# Patient Record
Sex: FEMALE | Race: WHITE | NOT HISPANIC OR LATINO | URBAN - METROPOLITAN AREA
[De-identification: names, ages, dates, MRNs, and addresses within clinical notes are randomized per-mention and may not be internally consistent; named-entity substitution may affect disease eponyms.]

---

## 2017-04-30 ENCOUNTER — EMERGENCY (EMERGENCY)
Facility: HOSPITAL | Age: 37
LOS: 1 days | Discharge: HOME | End: 2017-04-30
Attending: EMERGENCY MEDICINE | Admitting: EMERGENCY MEDICINE
Payer: COMMERCIAL

## 2017-04-30 VITALS
WEIGHT: 160.06 LBS | OXYGEN SATURATION: 98 % | TEMPERATURE: 98 F | SYSTOLIC BLOOD PRESSURE: 131 MMHG | HEIGHT: 66 IN | DIASTOLIC BLOOD PRESSURE: 71 MMHG | HEART RATE: 77 BPM | RESPIRATION RATE: 18 BRPM

## 2017-04-30 DIAGNOSIS — S99.911A UNSPECIFIED INJURY OF RIGHT ANKLE, INITIAL ENCOUNTER: ICD-10-CM

## 2017-04-30 DIAGNOSIS — M25.571 PAIN IN RIGHT ANKLE AND JOINTS OF RIGHT FOOT: ICD-10-CM

## 2017-04-30 DIAGNOSIS — Z88.0 ALLERGY STATUS TO PENICILLIN: ICD-10-CM

## 2017-04-30 PROCEDURE — 73620 X-RAY EXAM OF FOOT: CPT | Mod: 26,RT

## 2017-04-30 PROCEDURE — 73610 X-RAY EXAM OF ANKLE: CPT | Mod: 26,RT

## 2017-04-30 PROCEDURE — 99283 EMERGENCY DEPT VISIT LOW MDM: CPT | Mod: 25

## 2017-04-30 RX ORDER — TRAMADOL HYDROCHLORIDE 50 MG/1
1 TABLET ORAL
Qty: 12 | Refills: 0 | OUTPATIENT
Start: 2017-04-30 | End: 2017-05-03

## 2017-04-30 RX ORDER — ACETAMINOPHEN 500 MG
1 TABLET ORAL
Qty: 28 | Refills: 0 | OUTPATIENT
Start: 2017-04-30 | End: 2017-05-07

## 2017-04-30 NOTE — ED PROVIDER NOTE - MEDICAL DECISION MAKING DETAILS
patient rolled ankle while walking yesterday, difficulty bearing weight due to pain, nvi, prelim xrays no fx, probable sprain, zamora dressing, crutches, weight bearing as tolerated, rx pain meds - will avoid nsaids as patient has hx PUD, close follow up with ortho

## 2017-04-30 NOTE — ED PROVIDER NOTE - DIAGNOSTIC INTERPRETATION
Interpreted by PA  R ankle   No fracture, no dislocation (joint spaces grossly normal), no Foreign Body noted, soft tissue swelling    R foot   Interpreted by PA  No fracture, no dislocation (joint spaces grossly normal), no Foreign Body noted, soft tissue normal

## 2017-04-30 NOTE — ED ADULT NURSE NOTE - OBJECTIVE STATEMENT
Patient reports "Twisted" ankle while walking x1 day ago. Did not fall. c/o pain to R ankle, worse w/weight bearing. Took Tylenol last night w/mild relief. Ibuprofen offered--unable to take due to ulcer. Swelling to R ankle noted. Ice pack given. Instructed on use. Patient verbalized understanding. Xray done--awaiting results.

## 2017-04-30 NOTE — ED PROVIDER NOTE - OBJECTIVE STATEMENT
37 yo female otherwise healthy here c/o right ankle pain sustained while she was walking in wedge heels last night, rolled ankle while walking. No numbness, no weakness. States she has been hopping when walking, unable to bear significant weight onto right foot. No head trauma, no LOC. No other injuries. 37 yo female otherwise healthy here c/o right ankle pain sustained while she was walking in wedge heels last night, rolled ankle while walking. No numbness, no weakness. States she has been hopping when walking, unable to bear significant weight onto right foot. No head trauma, no LOC. No other injuries. She is declining pain medications in the ED.

## 2017-04-30 NOTE — ED PROVIDER NOTE - MUSCULOSKELETAL, MLM
RLE: +mild-moderate swelling to lateral and medial malleolus swelling, +ttp to med and lateral mal, good ROM ankle/foot. no foot tenderness. dp/pt pulses 2+ intact, able to wiggle toes, good cap refill, no proximal tib/fib tenderness, good ROM knee. unable to bear weight due to pain.

## 2018-02-25 VITALS
DIASTOLIC BLOOD PRESSURE: 76 MMHG | OXYGEN SATURATION: 100 % | RESPIRATION RATE: 22 BRPM | SYSTOLIC BLOOD PRESSURE: 118 MMHG | TEMPERATURE: 98 F | HEART RATE: 81 BPM | WEIGHT: 175.05 LBS

## 2018-02-25 LAB
ALBUMIN SERPL ELPH-MCNC: 3.4 G/DL — SIGNIFICANT CHANGE UP (ref 3.4–5)
ALP SERPL-CCNC: 85 U/L — SIGNIFICANT CHANGE UP (ref 40–120)
ALT FLD-CCNC: 21 U/L — SIGNIFICANT CHANGE UP (ref 12–42)
ANION GAP SERPL CALC-SCNC: 13 MMOL/L — SIGNIFICANT CHANGE UP (ref 9–16)
AST SERPL-CCNC: 19 U/L — SIGNIFICANT CHANGE UP (ref 15–37)
BILIRUB SERPL-MCNC: 0.4 MG/DL — SIGNIFICANT CHANGE UP (ref 0.2–1.2)
BUN SERPL-MCNC: 12 MG/DL — SIGNIFICANT CHANGE UP (ref 7–23)
CALCIUM SERPL-MCNC: 9.7 MG/DL — SIGNIFICANT CHANGE UP (ref 8.5–10.5)
CHLORIDE SERPL-SCNC: 103 MMOL/L — SIGNIFICANT CHANGE UP (ref 96–108)
CO2 SERPL-SCNC: 23 MMOL/L — SIGNIFICANT CHANGE UP (ref 22–31)
CREAT SERPL-MCNC: 0.8 MG/DL — SIGNIFICANT CHANGE UP (ref 0.5–1.3)
GLUCOSE SERPL-MCNC: 130 MG/DL — HIGH (ref 70–99)
HCT VFR BLD CALC: 43.1 % — SIGNIFICANT CHANGE UP (ref 34.5–45)
HGB BLD-MCNC: 14.1 G/DL — SIGNIFICANT CHANGE UP (ref 11.5–15.5)
LIDOCAIN IGE QN: 149 U/L — SIGNIFICANT CHANGE UP (ref 73–393)
MCHC RBC-ENTMCNC: 29.2 PG — SIGNIFICANT CHANGE UP (ref 27–34)
MCHC RBC-ENTMCNC: 32.7 G/DL — SIGNIFICANT CHANGE UP (ref 32–36)
MCV RBC AUTO: 89.2 FL — SIGNIFICANT CHANGE UP (ref 80–100)
PLATELET # BLD AUTO: 348 K/UL — SIGNIFICANT CHANGE UP (ref 150–400)
POTASSIUM SERPL-MCNC: 3.7 MMOL/L — SIGNIFICANT CHANGE UP (ref 3.5–5.3)
POTASSIUM SERPL-SCNC: 3.7 MMOL/L — SIGNIFICANT CHANGE UP (ref 3.5–5.3)
PROT SERPL-MCNC: 7.7 G/DL — SIGNIFICANT CHANGE UP (ref 6.4–8.2)
RBC # BLD: 4.83 M/UL — SIGNIFICANT CHANGE UP (ref 3.8–5.2)
RBC # FLD: 12.1 % — SIGNIFICANT CHANGE UP (ref 10.3–16.9)
SODIUM SERPL-SCNC: 139 MMOL/L — SIGNIFICANT CHANGE UP (ref 132–145)
WBC # BLD: 17.7 K/UL — HIGH (ref 3.8–10.5)
WBC # FLD AUTO: 17.7 K/UL — HIGH (ref 3.8–10.5)

## 2018-02-25 PROCEDURE — 99285 EMERGENCY DEPT VISIT HI MDM: CPT

## 2018-02-25 RX ORDER — IOHEXOL 300 MG/ML
50 INJECTION, SOLUTION INTRAVENOUS ONCE
Qty: 0 | Refills: 0 | Status: COMPLETED | OUTPATIENT
Start: 2018-02-25 | End: 2018-02-25

## 2018-02-25 RX ORDER — SODIUM CHLORIDE 9 MG/ML
3 INJECTION INTRAMUSCULAR; INTRAVENOUS; SUBCUTANEOUS ONCE
Qty: 0 | Refills: 0 | Status: COMPLETED | OUTPATIENT
Start: 2018-02-25 | End: 2018-02-25

## 2018-02-25 RX ORDER — MORPHINE SULFATE 50 MG/1
4 CAPSULE, EXTENDED RELEASE ORAL ONCE
Qty: 0 | Refills: 0 | Status: DISCONTINUED | OUTPATIENT
Start: 2018-02-25 | End: 2018-02-25

## 2018-02-25 RX ORDER — ONDANSETRON 8 MG/1
4 TABLET, FILM COATED ORAL ONCE
Qty: 0 | Refills: 0 | Status: COMPLETED | OUTPATIENT
Start: 2018-02-25 | End: 2018-02-25

## 2018-02-25 RX ADMIN — IOHEXOL 50 MILLILITER(S): 300 INJECTION, SOLUTION INTRAVENOUS at 23:14

## 2018-02-25 RX ADMIN — SODIUM CHLORIDE 3 MILLILITER(S): 9 INJECTION INTRAMUSCULAR; INTRAVENOUS; SUBCUTANEOUS at 23:15

## 2018-02-25 RX ADMIN — Medication 30 MILLIGRAM(S): at 22:30

## 2018-02-25 RX ADMIN — ONDANSETRON 4 MILLIGRAM(S): 8 TABLET, FILM COATED ORAL at 23:15

## 2018-02-25 NOTE — ED PROVIDER NOTE - MEDICAL DECISION MAKING DETAILS
37 F presents to ED c/o intermittent diffuse abdominal pain.  Pt arrived well appearing. vSS.  NAD.  Abd soft, diffusely tender.  WBc = 17.  CT abd/pelvis ordered.

## 2018-02-25 NOTE — ED ADULT TRIAGE NOTE - CHIEF COMPLAINT QUOTE
pt came in ambulatory, complaining of abdominal pain x 7 days, worsening today. Denies diarrhea, constipation, fevers, urinary sx. pt came in ambulatory, complaining of abdominal pain x 7 days, worsening today associated with nausea/vomiting. Denies diarrhea, constipation, fevers, urinary sx.

## 2018-02-25 NOTE — ED PROVIDER NOTE - PROGRESS NOTE DETAILS
CT shows partially contracted gallbladder without radiopaque stones.   Low-attenuation surrounding the gallbladder wall is suggestive for a   small amount of pericholecystic cystic fluid. No significant infiltration   of the surrounding fat is appreciated. Findings may represent an early   cholecystitis. WBC 17, normal LFTs, nml lipase. Repeat abdominal exam +RUQ tenderness, spoke with Dr. Estes covering ACS, will admit for acute cholecystitis, pen allergic, IV flagyl/levaquin, ordered, patient agrees with plan

## 2018-02-26 ENCOUNTER — INPATIENT (INPATIENT)
Facility: HOSPITAL | Age: 38
LOS: 1 days | Discharge: HOME | DRG: 419 | End: 2018-02-28
Attending: SURGERY | Admitting: SURGERY
Payer: COMMERCIAL

## 2018-02-26 LAB
APPEARANCE UR: CLEAR — SIGNIFICANT CHANGE UP
BILIRUB UR-MCNC: (no result)
COLOR SPEC: YELLOW — SIGNIFICANT CHANGE UP
DIFF PNL FLD: NEGATIVE — SIGNIFICANT CHANGE UP
GLUCOSE UR QL: NEGATIVE — SIGNIFICANT CHANGE UP
HCG UR QL: NEGATIVE — SIGNIFICANT CHANGE UP
KETONES UR-MCNC: NEGATIVE — SIGNIFICANT CHANGE UP
LEUKOCYTE ESTERASE UR-ACNC: NEGATIVE — SIGNIFICANT CHANGE UP
NITRITE UR-MCNC: NEGATIVE — SIGNIFICANT CHANGE UP
PH UR: 6.5 — SIGNIFICANT CHANGE UP (ref 5–8)
PROT UR-MCNC: (no result) MG/DL
SP GR SPEC: 1.02 — SIGNIFICANT CHANGE UP (ref 1–1.03)
UROBILINOGEN FLD QL: 1 E.U./DL — SIGNIFICANT CHANGE UP

## 2018-02-26 PROCEDURE — 76700 US EXAM ABDOM COMPLETE: CPT | Mod: 26

## 2018-02-26 PROCEDURE — 74177 CT ABD & PELVIS W/CONTRAST: CPT | Mod: 26

## 2018-02-26 RX ORDER — DOCUSATE SODIUM 100 MG
100 CAPSULE ORAL THREE TIMES A DAY
Qty: 0 | Refills: 0 | Status: DISCONTINUED | OUTPATIENT
Start: 2018-02-26 | End: 2018-02-26

## 2018-02-26 RX ORDER — ACETAMINOPHEN 500 MG
1000 TABLET ORAL ONCE
Qty: 0 | Refills: 0 | Status: COMPLETED | OUTPATIENT
Start: 2018-02-27 | End: 2018-02-27

## 2018-02-26 RX ORDER — HYDROMORPHONE HYDROCHLORIDE 2 MG/ML
0.3 INJECTION INTRAMUSCULAR; INTRAVENOUS; SUBCUTANEOUS EVERY 4 HOURS
Qty: 0 | Refills: 0 | Status: DISCONTINUED | OUTPATIENT
Start: 2018-02-26 | End: 2018-02-28

## 2018-02-26 RX ORDER — PANTOPRAZOLE SODIUM 20 MG/1
40 TABLET, DELAYED RELEASE ORAL EVERY 24 HOURS
Qty: 0 | Refills: 0 | Status: DISCONTINUED | OUTPATIENT
Start: 2018-02-26 | End: 2018-02-26

## 2018-02-26 RX ORDER — METRONIDAZOLE 500 MG
500 TABLET ORAL
Qty: 0 | Refills: 0 | Status: COMPLETED | OUTPATIENT
Start: 2018-02-26 | End: 2018-02-26

## 2018-02-26 RX ORDER — HEPARIN SODIUM 5000 [USP'U]/ML
5000 INJECTION INTRAVENOUS; SUBCUTANEOUS EVERY 8 HOURS
Qty: 0 | Refills: 0 | Status: DISCONTINUED | OUTPATIENT
Start: 2018-02-26 | End: 2018-02-28

## 2018-02-26 RX ORDER — METRONIDAZOLE 500 MG
500 TABLET ORAL EVERY 8 HOURS
Qty: 0 | Refills: 0 | Status: DISCONTINUED | OUTPATIENT
Start: 2018-02-26 | End: 2018-02-26

## 2018-02-26 RX ORDER — HEPARIN SODIUM 5000 [USP'U]/ML
5000 INJECTION INTRAVENOUS; SUBCUTANEOUS EVERY 8 HOURS
Qty: 0 | Refills: 0 | Status: DISCONTINUED | OUTPATIENT
Start: 2018-02-26 | End: 2018-02-26

## 2018-02-26 RX ORDER — ONDANSETRON 8 MG/1
4 TABLET, FILM COATED ORAL EVERY 6 HOURS
Qty: 0 | Refills: 0 | Status: DISCONTINUED | OUTPATIENT
Start: 2018-02-26 | End: 2018-02-26

## 2018-02-26 RX ORDER — HYDROMORPHONE HYDROCHLORIDE 2 MG/ML
1 INJECTION INTRAMUSCULAR; INTRAVENOUS; SUBCUTANEOUS EVERY 4 HOURS
Qty: 0 | Refills: 0 | Status: DISCONTINUED | OUTPATIENT
Start: 2018-02-26 | End: 2018-02-26

## 2018-02-26 RX ORDER — PANTOPRAZOLE SODIUM 20 MG/1
40 TABLET, DELAYED RELEASE ORAL DAILY
Qty: 0 | Refills: 0 | Status: DISCONTINUED | OUTPATIENT
Start: 2018-02-26 | End: 2018-02-28

## 2018-02-26 RX ORDER — CEFTRIAXONE 500 MG/1
1 INJECTION, POWDER, FOR SOLUTION INTRAMUSCULAR; INTRAVENOUS EVERY 12 HOURS
Qty: 0 | Refills: 0 | Status: DISCONTINUED | OUTPATIENT
Start: 2018-02-26 | End: 2018-02-28

## 2018-02-26 RX ORDER — HYDROMORPHONE HYDROCHLORIDE 2 MG/ML
0.5 INJECTION INTRAMUSCULAR; INTRAVENOUS; SUBCUTANEOUS EVERY 4 HOURS
Qty: 0 | Refills: 0 | Status: DISCONTINUED | OUTPATIENT
Start: 2018-02-26 | End: 2018-02-26

## 2018-02-26 RX ORDER — ONDANSETRON 8 MG/1
4 TABLET, FILM COATED ORAL EVERY 4 HOURS
Qty: 0 | Refills: 0 | Status: DISCONTINUED | OUTPATIENT
Start: 2018-02-26 | End: 2018-02-28

## 2018-02-26 RX ORDER — ACETAMINOPHEN 500 MG
1000 TABLET ORAL ONCE
Qty: 0 | Refills: 0 | Status: COMPLETED | OUTPATIENT
Start: 2018-02-26 | End: 2018-02-26

## 2018-02-26 RX ORDER — HYDROMORPHONE HYDROCHLORIDE 2 MG/ML
0.5 INJECTION INTRAMUSCULAR; INTRAVENOUS; SUBCUTANEOUS
Qty: 0 | Refills: 0 | Status: DISCONTINUED | OUTPATIENT
Start: 2018-02-26 | End: 2018-02-28

## 2018-02-26 RX ORDER — SODIUM CHLORIDE 9 MG/ML
1000 INJECTION, SOLUTION INTRAVENOUS
Qty: 0 | Refills: 0 | Status: DISCONTINUED | OUTPATIENT
Start: 2018-02-26 | End: 2018-02-28

## 2018-02-26 RX ORDER — CEFOXITIN 1 G/1
2 INJECTION, POWDER, FOR SOLUTION INTRAVENOUS EVERY 6 HOURS
Qty: 0 | Refills: 0 | Status: DISCONTINUED | OUTPATIENT
Start: 2018-02-26 | End: 2018-02-26

## 2018-02-26 RX ORDER — HYDROMORPHONE HYDROCHLORIDE 2 MG/ML
0.5 INJECTION INTRAMUSCULAR; INTRAVENOUS; SUBCUTANEOUS EVERY 4 HOURS
Qty: 0 | Refills: 0 | Status: DISCONTINUED | OUTPATIENT
Start: 2018-02-26 | End: 2018-02-28

## 2018-02-26 RX ORDER — SODIUM CHLORIDE 9 MG/ML
1000 INJECTION, SOLUTION INTRAVENOUS
Qty: 0 | Refills: 0 | Status: DISCONTINUED | OUTPATIENT
Start: 2018-02-26 | End: 2018-02-26

## 2018-02-26 RX ORDER — KETOROLAC TROMETHAMINE 30 MG/ML
30 SYRINGE (ML) INJECTION ONCE
Qty: 0 | Refills: 0 | Status: DISCONTINUED | OUTPATIENT
Start: 2018-02-26 | End: 2018-02-25

## 2018-02-26 RX ORDER — METRONIDAZOLE 500 MG
500 TABLET ORAL EVERY 8 HOURS
Qty: 0 | Refills: 0 | Status: DISCONTINUED | OUTPATIENT
Start: 2018-02-26 | End: 2018-02-28

## 2018-02-26 RX ORDER — KETOROLAC TROMETHAMINE 30 MG/ML
15 SYRINGE (ML) INJECTION EVERY 6 HOURS
Qty: 0 | Refills: 0 | Status: COMPLETED | OUTPATIENT
Start: 2018-02-26 | End: 2018-02-28

## 2018-02-26 RX ORDER — SENNA PLUS 8.6 MG/1
2 TABLET ORAL AT BEDTIME
Qty: 0 | Refills: 0 | Status: DISCONTINUED | OUTPATIENT
Start: 2018-02-26 | End: 2018-02-26

## 2018-02-26 RX ADMIN — HEPARIN SODIUM 5000 UNIT(S): 5000 INJECTION INTRAVENOUS; SUBCUTANEOUS at 22:12

## 2018-02-26 RX ADMIN — Medication 100 MILLIGRAM(S): at 03:30

## 2018-02-26 RX ADMIN — Medication 400 MILLIGRAM(S): at 22:12

## 2018-02-26 RX ADMIN — Medication 1000 MILLIGRAM(S): at 22:49

## 2018-02-26 RX ADMIN — HYDROMORPHONE HYDROCHLORIDE 0.5 MILLIGRAM(S): 2 INJECTION INTRAMUSCULAR; INTRAVENOUS; SUBCUTANEOUS at 16:45

## 2018-02-26 RX ADMIN — Medication 100 MILLIGRAM(S): at 07:51

## 2018-02-26 RX ADMIN — CEFOXITIN 100 GRAM(S): 1 INJECTION, POWDER, FOR SOLUTION INTRAVENOUS at 09:51

## 2018-02-26 RX ADMIN — Medication 15 MILLIGRAM(S): at 22:12

## 2018-02-26 RX ADMIN — Medication 500 MILLIGRAM(S): at 22:12

## 2018-02-26 RX ADMIN — SODIUM CHLORIDE 130 MILLILITER(S): 9 INJECTION, SOLUTION INTRAVENOUS at 06:04

## 2018-02-26 RX ADMIN — HYDROMORPHONE HYDROCHLORIDE 0.5 MILLIGRAM(S): 2 INJECTION INTRAMUSCULAR; INTRAVENOUS; SUBCUTANEOUS at 17:00

## 2018-02-26 RX ADMIN — Medication 15 MILLIGRAM(S): at 22:31

## 2018-02-26 RX ADMIN — HYDROMORPHONE HYDROCHLORIDE 0.5 MILLIGRAM(S): 2 INJECTION INTRAMUSCULAR; INTRAVENOUS; SUBCUTANEOUS at 18:54

## 2018-02-26 RX ADMIN — HYDROMORPHONE HYDROCHLORIDE 0.5 MILLIGRAM(S): 2 INJECTION INTRAMUSCULAR; INTRAVENOUS; SUBCUTANEOUS at 19:51

## 2018-02-26 RX ADMIN — PANTOPRAZOLE SODIUM 40 MILLIGRAM(S): 20 TABLET, DELAYED RELEASE ORAL at 07:51

## 2018-02-26 NOTE — ED ADULT NURSE NOTE - CHIEF COMPLAINT QUOTE
pt came in ambulatory, complaining of abdominal pain x 7 days, worsening today associated with nausea/vomiting. Denies diarrhea, constipation, fevers, urinary sx.

## 2018-02-26 NOTE — H&P ADULT - NSHPPHYSICALEXAM_GEN_ALL_CORE
Vital Signs Last 24 Hrs  T(C): 36 (26 Feb 2018 05:01), Max: 36.8 (26 Feb 2018 01:11)  T(F): 96.8 (26 Feb 2018 05:01), Max: 98.3 (26 Feb 2018 01:11)  HR: 88 (26 Feb 2018 05:01) (79 - 88)  BP: 118/58 (26 Feb 2018 05:01) (100/65 - 118/76)  BP(mean): --  RR: 16 (26 Feb 2018 05:01) (16 - 22)  SpO2: 98% (26 Feb 2018 05:01) (97% - 100%)      General: A/O x4 NAD  Resp: Normal work of breathing on RA  CV: RRR  ABD: soft, non-distended, tender to epigastrium. + Gordon's sign

## 2018-02-26 NOTE — H&P ADULT - ASSESSMENT
37 F with PMHx of GERD, Rose's esophagus, gastric ulcers, transferred from [facility] for intermittent epigastric pain x1 week with WBC of 17.7 and CT showing small sylvia-cholecystic fluid    -admit to regional, Dr Estes  -Pain/ Nausea control PRN  -IVF  -NPO  -Protonix  -Levaquin/ Flagyl  -SQH, SCD, OOB  -RUQ US  -Plan discussed with Chief resident and Dr Estes

## 2018-02-26 NOTE — BRIEF OPERATIVE NOTE - PRE-OP DX
Acute cholecystitis  02/26/2018    Active  Vadim Mcfadden  Umbilical hernia without obstruction and without gangrene  02/26/2018    Active  Vadim Mcfadden

## 2018-02-26 NOTE — PROGRESS NOTE ADULT - SUBJECTIVE AND OBJECTIVE BOX
Procedure: lap nani  Surgeon: Dr. Cabrales    S: Pt has no complaints. Denies CP, SOB, GRANDE, calf tenderness. Pain controlled with medication.    O:  T(C): 36.9 (02-26-18 @ 17:30), Max: 36.9 (02-26-18 @ 17:30)  T(F): 98.4 (02-26-18 @ 17:30), Max: 98.4 (02-26-18 @ 17:30)  HR: 78 (02-26-18 @ 17:45) (68 - 78)  BP: 114/60 (02-26-18 @ 17:45) (112/69 - 118/58)  RR: 15 (02-26-18 @ 17:45) (15 - 21)  SpO2: 96% (02-26-18 @ 17:45) (95% - 97%)  Wt(kg): --                        14.1   17.7  )-----------( 348      ( 25 Feb 2018 22:20 )             43.1     02-25    139  |  103  |  12  ----------------------------<  130<H>  3.7   |  23  |  0.80    Ca    9.7      25 Feb 2018 22:20    TPro  7.7  /  Alb  3.4  /  TBili  0.4  /  DBili  x   /  AST  19  /  ALT  21  /  AlkPhos  85  02-25      Gen: NAD, resting comfortably in bed  C/V: NSR  Pulm: Nonlabored breathing, no respiratory distress  Abd: soft, NT/ND Incision: CDI , GIOVANNI intact  Extrem: WWP, no calf edema, SCDs in place      A/P: 37yFemale s/p lap nani  Diet: CLD  IVF: LR  Pain/nausea control  DVT ppx: SQH,SCDS

## 2018-02-26 NOTE — BRIEF OPERATIVE NOTE - OPERATION/FINDINGS
Cutdown access to abdomen via umbilical stalk showing umbilical hernia. Moise trochar introduced, large amount of omental adhesions overlying gallbladder, bluntly dissected. Gallbladder diffusely, severely edematous and friable. Dissection ensued with electrocautery to show cystic artery and cystic duct. Large Lunds' node visualized. Cystic artery clipped x2 and cystic duct ligated with endoligature. Gallbladder removed via umbilicus. Hemostasis obtained. Irrigation with 1L saline. Umbilical hernia closed primarily with x3 maxon 0 Maxon suture

## 2018-02-26 NOTE — H&P ADULT - NSHPLABSRESULTS_GEN_ALL_CORE
14.1   17.7  )-----------( 348      ( 25 Feb 2018 22:20 )             43.1   02-25    139  |  103  |  12  ----------------------------<  130<H>  3.7   |  23  |  0.80    Ca    9.7      25 Feb 2018 22:20    TPro  7.7  /  Alb  3.4  /  TBili  0.4  /  DBili  x   /  AST  19  /  ALT  21  /  AlkPhos  85  02-25      < from: CT Abdomen and Pelvis w/ Oral Cont and w/ IV Cont (02.26.18 @ 02:39) >    FINDINGS:    LUNGS: Lung bases are clear. There is no cardiomegaly or pericardial   effusion.    HEPATOBILIARY: No hepatomegaly. No intrahepatic biliary dilatation.   Partially contracted gallbladder without radiopaque stones.   Low-attenuation surrounding the gallbladder wall is suggestive for a   small amount of pericholecystic cystic fluid. No significant fat   stranding appreciated.     SPLEEN: No splenomegaly..    PANCREAS: No peripancreatic inflammatory change or pancreatic ductal   dilatation..    ADRENAL GLANDS: No focal adrenal mass.    KIDNEYS: Grossly symmetric bilateral nephrograms without hydronephrosis.   No obstructing renal or ureteral stone. Thick-walled urinary bladder.   Correlate to urinalysis results to exclude underlying cystitis.    ABDOMINOPELVIC NODES: No bulky retroperitoneal adenopathy.    PELVIC ORGANS: Grossly unremarkable.    PERITONEUM  /MESENTERY/BOWEL: Small hiatal hernia. Oral contrast progresses to the   descending colon. No ascites, free air or bowel obstruction.  Appendix is   normal in caliber and thickness.    BONES/SOFT TISSUES: No suspicious osseous lesion.     OTHER: None    IMPRESSION:  No bowel obstruction or acute appendicitis.    < end of copied text >

## 2018-02-26 NOTE — BRIEF OPERATIVE NOTE - PROCEDURE
<<-----Click on this checkbox to enter Procedure Umbilical hernia repair  02/26/2018    Active  CDEJESUS  Laparoscopic cholecystectomy  02/26/2018    Active  CDEMARIA ESTHERS

## 2018-02-26 NOTE — BRIEF OPERATIVE NOTE - POST-OP DX
Acute cholecystitis without calculus  02/26/2018    Active  Vadim Mcfadden  Umbilical hernia without obstruction and without gangrene  02/26/2018    Active  Vadim Mcfadden

## 2018-02-26 NOTE — H&P ADULT - HISTORY OF PRESENT ILLNESS
37  with PMHx of GERD, ramirez's esophagus, gastric ulcers (H. pylori negative) presented to [facility] with epigastric pain x9 hours. Pain first began 1 week ago after eating shrimp scampi as sharp, epigastric, constricting pain which was non-radiating and 9/10 with accompanied nausea/ vomiting and lasted for 8 hours. Pain then became mild tightness to epigastrium until it returned 5 days ago after eating chicken sandwich and french fries, subsided after several hours returning a third time on 2/25 approximately 9pm not associated with food this time. For this episode she endorses sweating with pain, denies fever/chills, Nausea/ vomiting, diarrhea/ constipation, cough, sob, cp, dysuria. She has been evaluated and EGD by GI Dr Heraclio Britton, no Hx of colonoscopy.     Upon arrival at [facility] she was found to be afebrile, normo-cardic, normo-tensive with WBC of 17.7 normal LFT and t bili. CT was significant for contracted gallbladder with small pericholecystic fluid. She was subsequently transferred to [facility] for further evaluation and treatment.

## 2018-02-27 LAB
ALBUMIN SERPL ELPH-MCNC: 3.7 G/DL — SIGNIFICANT CHANGE UP (ref 3.3–5)
ALP SERPL-CCNC: 146 U/L — HIGH (ref 40–120)
ALT FLD-CCNC: 82 U/L — HIGH (ref 10–45)
ANION GAP SERPL CALC-SCNC: 14 MMOL/L — SIGNIFICANT CHANGE UP (ref 5–17)
AST SERPL-CCNC: 54 U/L — HIGH (ref 10–40)
BASOPHILS NFR BLD AUTO: 0.1 % — SIGNIFICANT CHANGE UP (ref 0–2)
BILIRUB SERPL-MCNC: 0.4 MG/DL — SIGNIFICANT CHANGE UP (ref 0.2–1.2)
BUN SERPL-MCNC: 8 MG/DL — SIGNIFICANT CHANGE UP (ref 7–23)
CALCIUM SERPL-MCNC: 9.1 MG/DL — SIGNIFICANT CHANGE UP (ref 8.4–10.5)
CHLORIDE SERPL-SCNC: 103 MMOL/L — SIGNIFICANT CHANGE UP (ref 96–108)
CO2 SERPL-SCNC: 19 MMOL/L — LOW (ref 22–31)
CREAT SERPL-MCNC: 0.6 MG/DL — SIGNIFICANT CHANGE UP (ref 0.5–1.3)
EOSINOPHIL NFR BLD AUTO: 0 % — SIGNIFICANT CHANGE UP (ref 0–6)
GLUCOSE SERPL-MCNC: 119 MG/DL — HIGH (ref 70–99)
HCT VFR BLD CALC: 38.2 % — SIGNIFICANT CHANGE UP (ref 34.5–45)
HGB BLD-MCNC: 13 G/DL — SIGNIFICANT CHANGE UP (ref 11.5–15.5)
LYMPHOCYTES # BLD AUTO: 14.2 % — SIGNIFICANT CHANGE UP (ref 13–44)
MCHC RBC-ENTMCNC: 29.5 PG — SIGNIFICANT CHANGE UP (ref 27–34)
MCHC RBC-ENTMCNC: 34 G/DL — SIGNIFICANT CHANGE UP (ref 32–36)
MCV RBC AUTO: 86.8 FL — SIGNIFICANT CHANGE UP (ref 80–100)
MONOCYTES NFR BLD AUTO: 7.6 % — SIGNIFICANT CHANGE UP (ref 2–14)
NEUTROPHILS NFR BLD AUTO: 78.1 % — HIGH (ref 43–77)
PLATELET # BLD AUTO: 231 K/UL — SIGNIFICANT CHANGE UP (ref 150–400)
POTASSIUM SERPL-MCNC: 4.3 MMOL/L — SIGNIFICANT CHANGE UP (ref 3.5–5.3)
POTASSIUM SERPL-SCNC: 4.3 MMOL/L — SIGNIFICANT CHANGE UP (ref 3.5–5.3)
PROT SERPL-MCNC: 7.1 G/DL — SIGNIFICANT CHANGE UP (ref 6–8.3)
RBC # BLD: 4.4 M/UL — SIGNIFICANT CHANGE UP (ref 3.8–5.2)
RBC # FLD: 12.7 % — SIGNIFICANT CHANGE UP (ref 10.3–16.9)
SODIUM SERPL-SCNC: 136 MMOL/L — SIGNIFICANT CHANGE UP (ref 135–145)
WBC # BLD: 15.1 K/UL — HIGH (ref 3.8–10.5)
WBC # FLD AUTO: 15.1 K/UL — HIGH (ref 3.8–10.5)

## 2018-02-27 RX ORDER — METRONIDAZOLE 500 MG
500 TABLET ORAL EVERY 8 HOURS
Qty: 0 | Refills: 0 | Status: DISCONTINUED | OUTPATIENT
Start: 2018-02-27 | End: 2018-02-27

## 2018-02-27 RX ORDER — LANOLIN ALCOHOL/MO/W.PET/CERES
5 CREAM (GRAM) TOPICAL AT BEDTIME
Qty: 0 | Refills: 0 | Status: DISCONTINUED | OUTPATIENT
Start: 2018-02-27 | End: 2018-02-28

## 2018-02-27 RX ADMIN — HEPARIN SODIUM 5000 UNIT(S): 5000 INJECTION INTRAVENOUS; SUBCUTANEOUS at 06:10

## 2018-02-27 RX ADMIN — Medication 15 MILLIGRAM(S): at 22:48

## 2018-02-27 RX ADMIN — Medication 15 MILLIGRAM(S): at 06:10

## 2018-02-27 RX ADMIN — HYDROMORPHONE HYDROCHLORIDE 0.5 MILLIGRAM(S): 2 INJECTION INTRAMUSCULAR; INTRAVENOUS; SUBCUTANEOUS at 22:47

## 2018-02-27 RX ADMIN — HEPARIN SODIUM 5000 UNIT(S): 5000 INJECTION INTRAVENOUS; SUBCUTANEOUS at 13:12

## 2018-02-27 RX ADMIN — Medication 1000 MILLIGRAM(S): at 06:56

## 2018-02-27 RX ADMIN — HYDROMORPHONE HYDROCHLORIDE 0.5 MILLIGRAM(S): 2 INJECTION INTRAMUSCULAR; INTRAVENOUS; SUBCUTANEOUS at 11:20

## 2018-02-27 RX ADMIN — Medication 500 MILLIGRAM(S): at 13:11

## 2018-02-27 RX ADMIN — Medication 15 MILLIGRAM(S): at 13:27

## 2018-02-27 RX ADMIN — Medication 15 MILLIGRAM(S): at 17:12

## 2018-02-27 RX ADMIN — Medication 15 MILLIGRAM(S): at 06:56

## 2018-02-27 RX ADMIN — HYDROMORPHONE HYDROCHLORIDE 0.5 MILLIGRAM(S): 2 INJECTION INTRAMUSCULAR; INTRAVENOUS; SUBCUTANEOUS at 06:56

## 2018-02-27 RX ADMIN — Medication 15 MILLIGRAM(S): at 17:27

## 2018-02-27 RX ADMIN — HYDROMORPHONE HYDROCHLORIDE 0.5 MILLIGRAM(S): 2 INJECTION INTRAMUSCULAR; INTRAVENOUS; SUBCUTANEOUS at 06:18

## 2018-02-27 RX ADMIN — CEFTRIAXONE 100 GRAM(S): 500 INJECTION, POWDER, FOR SOLUTION INTRAMUSCULAR; INTRAVENOUS at 00:25

## 2018-02-27 RX ADMIN — Medication 400 MILLIGRAM(S): at 06:11

## 2018-02-27 RX ADMIN — Medication 15 MILLIGRAM(S): at 23:34

## 2018-02-27 RX ADMIN — HYDROMORPHONE HYDROCHLORIDE 0.5 MILLIGRAM(S): 2 INJECTION INTRAMUSCULAR; INTRAVENOUS; SUBCUTANEOUS at 10:20

## 2018-02-27 RX ADMIN — Medication 15 MILLIGRAM(S): at 13:11

## 2018-02-27 RX ADMIN — CEFTRIAXONE 100 GRAM(S): 500 INJECTION, POWDER, FOR SOLUTION INTRAMUSCULAR; INTRAVENOUS at 22:47

## 2018-02-27 RX ADMIN — Medication 500 MILLIGRAM(S): at 06:10

## 2018-02-27 RX ADMIN — HEPARIN SODIUM 5000 UNIT(S): 5000 INJECTION INTRAVENOUS; SUBCUTANEOUS at 22:47

## 2018-02-27 RX ADMIN — PANTOPRAZOLE SODIUM 40 MILLIGRAM(S): 20 TABLET, DELAYED RELEASE ORAL at 13:12

## 2018-02-27 RX ADMIN — CEFTRIAXONE 100 GRAM(S): 500 INJECTION, POWDER, FOR SOLUTION INTRAMUSCULAR; INTRAVENOUS at 10:20

## 2018-02-27 RX ADMIN — Medication 500 MILLIGRAM(S): at 22:47

## 2018-02-27 NOTE — PROGRESS NOTE ADULT - ASSESSMENT
43 F with acute cholecystitis now s/p laparoscopic cholecystectomy    -Pain/ Nausea control prn  -IVF  -CLD  -Webb  -Ceftriaxone/ Flagyl  -SCD, SQH, OOB  -GIOVANNI

## 2018-02-27 NOTE — PROGRESS NOTE ADULT - SUBJECTIVE AND OBJECTIVE BOX
INTERVAL HPI/OVERNIGHT EVENTS:   SURGERY ATTENDING    STATUS POST:  Laparoscopic cholecystectomy, MONIQUE, drainage for acute cholecystitis, repair of umbilical hernia    POST OPERATIVE DAY #:     SUBJECTIVE:  Flatus: [x ] YES [ ] NO             Bowel Movement: [ ] YES [x ] NO  Pain (0-10):         2   Pain Control Adequate: [x ] YES [ ] NO  Nausea: [ ] YES [x ] NO            Vomiting: [ ] YES [x ] NO  Diarrhea: [ ] YES [x ] NO         Constipation: [ ] YES [x ] NO     Chest Pain: [ ] YES [x ] NO    SOB:  [ ] YES [x ] NO    MEDICATIONS  (STANDING):  cefTRIAXone   IVPB 1 Gram(s) IV Intermittent every 12 hours  heparin  Injectable 5000 Unit(s) SubCutaneous every 8 hours  ketorolac   Injectable 15 milliGRAM(s) IV Push every 6 hours  lactated ringers. 1000 milliLiter(s) (130 mL/Hr) IV Continuous <Continuous>  metroNIDAZOLE    Tablet 500 milliGRAM(s) Oral every 8 hours  pantoprazole  Injectable 40 milliGRAM(s) IV Push daily    MEDICATIONS  (PRN):  HYDROmorphone  Injectable 0.5 milliGRAM(s) IV Push every 1 hour PRN Severe Pain  HYDROmorphone  Injectable 0.5 milliGRAM(s) IV Push every 4 hours PRN Severe Pain (7 - 10)  HYDROmorphone  Injectable 0.3 milliGRAM(s) IV Push every 4 hours PRN Moderate Pain (4 - 6)  ondansetron Injectable 4 milliGRAM(s) IV Push every 4 hours PRN Nausea and/or Vomiting      Vital Signs Last 24 Hrs  T(C): 36.5 (2018 15:48), Max: 37.6 (2018 18:40)  T(F): 97.7 (2018 15:48), Max: 99.6 (2018 18:40)  HR: 79 (2018 15:48) (68 - 85)  BP: 123/65 (2018 15:48) (114/60 - 137/65)  BP(mean): 86 (2018 17:45) (80 - 86)  RR: 17 (2018 15:48) (15 - 21)  SpO2: 94% (2018 15:48) (94% - 98%)    PHYSICAL EXAM:      Constitutional:    Eyes:    ENMT:    Neck:    Breasts:    Back:    Respiratory:    Cardiovascular:    Gastrointestinal:    Genitourinary:    Rectal:    Extremities:    Vascular:    Neurological:    Skin:    Lymph Nodes:    Musculoskeletal:    Psychiatric:        I&O's Detail    2018 07:01  -  2018 07:00  --------------------------------------------------------  IN:    IV PiggyBack: 50 mL    lactated ringers.: 910 mL    lactated ringers.: 1950 mL  Total IN: 2910 mL    OUT:    Bulb: 85 mL    Indwelling Catheter - Urethral: 2525 mL  Total OUT: 2610 mL    Total NET: 300 mL      2018 07:01  -  2018 17:11  --------------------------------------------------------  IN:    Oral Fluid: 220 mL  Total IN: 220 mL    OUT:    Bulb: 20 mL    Voided: 400 mL  Total OUT: 420 mL    Total NET: -200 mL          LABS:                        13.0   15.1  )-----------( 231      ( 2018 07:07 )             38.2         136  |  103  |  8   ----------------------------<  119<H>  4.3   |  19<L>  |  0.60    Ca    9.1      2018 07:07    TPro  7.1  /  Alb  3.7  /  TBili  0.4  /  DBili  x   /  AST  54<H>  /  ALT  82<H>  /  AlkPhos  146<H>        Urinalysis Basic - ( 2018 01:04 )    Color: Yellow / Appearance: Clear / S.025 / pH: x  Gluc: x / Ketone: NEGATIVE  / Bili: Small / Urobili: 1.0 E.U./dL   Blood: x / Protein: Trace mg/dL / Nitrite: NEGATIVE   Leuk Esterase: NEGATIVE / RBC: < 5 /HPF / WBC 5-10 /HPF   Sq Epi: x / Non Sq Epi: Many /HPF / Bacteria: Many /HPF        RADIOLOGY & ADDITIONAL STUDIES:

## 2018-02-27 NOTE — PROGRESS NOTE ADULT - SUBJECTIVE AND OBJECTIVE BOX
2/26: s/p lap nani, POC wnl      43 F with acute cholecystitis now s/p laparoscopic cholecystectomy    -Pain/ Nausea control prn  -IVF  -CLD  -Webb  -Ceftriaxone/ Flagyl  -SCD, SQH, OOB  -GIOVANNI : s/p lap nani, POC wnl      SUBJECTIVE: Denies any complaints  Flatus: [ ] YES [x ] NO             Bowel Movement: [ ] YES [x ] NO  Pain (0-10):            Pain Control Adequate: [x ] YES [ ] NO  Nausea: [ ] YES [ x] NO            Vomiting: [ ] YES [x ] NO  Diarrhea: [ ] YES [x ] NO         Constipation: [ ] YES [x ] NO     Chest Pain: [ ] YES [x ] NO    SOB:  [ ] YES [x ] NO    MEDICATIONS  (STANDING):  cefTRIAXone   IVPB 1 Gram(s) IV Intermittent every 12 hours  heparin  Injectable 5000 Unit(s) SubCutaneous every 8 hours  ketorolac   Injectable 15 milliGRAM(s) IV Push every 6 hours  lactated ringers. 1000 milliLiter(s) (130 mL/Hr) IV Continuous <Continuous>  metroNIDAZOLE    Tablet 500 milliGRAM(s) Oral every 8 hours  pantoprazole  Injectable 40 milliGRAM(s) IV Push daily    MEDICATIONS  (PRN):  HYDROmorphone  Injectable 0.5 milliGRAM(s) IV Push every 1 hour PRN Severe Pain  HYDROmorphone  Injectable 0.5 milliGRAM(s) IV Push every 4 hours PRN Severe Pain (7 - 10)  HYDROmorphone  Injectable 0.3 milliGRAM(s) IV Push every 4 hours PRN Moderate Pain (4 - 6)  ondansetron Injectable 4 milliGRAM(s) IV Push every 4 hours PRN Nausea and/or Vomiting      Vital Signs Last 24 Hrs  T(C): 37.1 (2018 05:43), Max: 37.6 (2018 18:40)  T(F): 98.7 (2018 05:43), Max: 99.6 (2018 18:40)  HR: 78 (2018 05:43) (68 - 85)  BP: 125/75 (2018 05:43) (102/63 - 137/65)  BP(mean): 86 (2018 17:45) (78 - 86)  RR: 19 (2018 05:43) (15 - 21)  SpO2: 95% (2018 05:43) (94% - 97%)    PHYSICAL EXAM:      Constitutional: A&Ox3    Respiratory: non labored breathing, no respiratory distress    Cardiovascular: NSR, RRR    Gastrointestinal: Soft ND,NT                 Incision: CDI    Genitourinary: Webb    Extremities: (-) edema                  I&O's Detail    2018 07:01  -  2018 07:00  --------------------------------------------------------  IN:    IV PiggyBack: 50 mL    lactated ringers.: 910 mL    lactated ringers.: 1950 mL  Total IN: 2910 mL    OUT:    Bulb: 85 mL    Indwelling Catheter - Urethral: 2525 mL  Total OUT: 2610 mL    Total NET: 300 mL          LABS:                        13.0   15.1  )-----------( 231      ( 2018 07:07 )             38.2     02-    139  |  103  |  12  ----------------------------<  130<H>  3.7   |  23  |  0.80    Ca    9.7      2018 22:20    TPro  7.7  /  Alb  3.4  /  TBili  0.4  /  DBili  x   /  AST  19  /  ALT  21  /  AlkPhos  85        Urinalysis Basic - ( 2018 01:04 )    Color: Yellow / Appearance: Clear / S.025 / pH: x  Gluc: x / Ketone: NEGATIVE  / Bili: Small / Urobili: 1.0 E.U./dL   Blood: x / Protein: Trace mg/dL / Nitrite: NEGATIVE   Leuk Esterase: NEGATIVE / RBC: < 5 /HPF / WBC 5-10 /HPF   Sq Epi: x / Non Sq Epi: Many /HPF / Bacteria: Many /HPF        RADIOLOGY & ADDITIONAL STUDIES:

## 2018-02-27 NOTE — PROGRESS NOTE ADULT - ATTENDING COMMENTS
Abdomen soft, BS+, Flatus+, BM-, tolerated clears, advance to full liquid diet.  OOB, Spirometry, IV ABX.

## 2018-02-28 VITALS
TEMPERATURE: 97 F | RESPIRATION RATE: 16 BRPM | DIASTOLIC BLOOD PRESSURE: 75 MMHG | HEART RATE: 81 BPM | OXYGEN SATURATION: 95 % | SYSTOLIC BLOOD PRESSURE: 135 MMHG

## 2018-02-28 LAB
ALBUMIN SERPL ELPH-MCNC: 3.2 G/DL — LOW (ref 3.3–5)
ALP SERPL-CCNC: 119 U/L — SIGNIFICANT CHANGE UP (ref 40–120)
ALT FLD-CCNC: 71 U/L — HIGH (ref 10–45)
ANION GAP SERPL CALC-SCNC: 12 MMOL/L — SIGNIFICANT CHANGE UP (ref 5–17)
AST SERPL-CCNC: 32 U/L — SIGNIFICANT CHANGE UP (ref 10–40)
BILIRUB SERPL-MCNC: 0.4 MG/DL — SIGNIFICANT CHANGE UP (ref 0.2–1.2)
BUN SERPL-MCNC: 8 MG/DL — SIGNIFICANT CHANGE UP (ref 7–23)
CALCIUM SERPL-MCNC: 8.7 MG/DL — SIGNIFICANT CHANGE UP (ref 8.4–10.5)
CHLORIDE SERPL-SCNC: 103 MMOL/L — SIGNIFICANT CHANGE UP (ref 96–108)
CO2 SERPL-SCNC: 24 MMOL/L — SIGNIFICANT CHANGE UP (ref 22–31)
CREAT SERPL-MCNC: 0.7 MG/DL — SIGNIFICANT CHANGE UP (ref 0.5–1.3)
GLUCOSE SERPL-MCNC: 105 MG/DL — HIGH (ref 70–99)
HCT VFR BLD CALC: 38.3 % — SIGNIFICANT CHANGE UP (ref 34.5–45)
HGB BLD-MCNC: 12.6 G/DL — SIGNIFICANT CHANGE UP (ref 11.5–15.5)
MAGNESIUM SERPL-MCNC: 1.8 MG/DL — SIGNIFICANT CHANGE UP (ref 1.6–2.6)
MCHC RBC-ENTMCNC: 29.2 PG — SIGNIFICANT CHANGE UP (ref 27–34)
MCHC RBC-ENTMCNC: 32.9 G/DL — SIGNIFICANT CHANGE UP (ref 32–36)
MCV RBC AUTO: 88.9 FL — SIGNIFICANT CHANGE UP (ref 80–100)
PHOSPHATE SERPL-MCNC: 2.9 MG/DL — SIGNIFICANT CHANGE UP (ref 2.5–4.5)
PLATELET # BLD AUTO: 222 K/UL — SIGNIFICANT CHANGE UP (ref 150–400)
POTASSIUM SERPL-MCNC: 3.6 MMOL/L — SIGNIFICANT CHANGE UP (ref 3.5–5.3)
POTASSIUM SERPL-SCNC: 3.6 MMOL/L — SIGNIFICANT CHANGE UP (ref 3.5–5.3)
PROT SERPL-MCNC: 6.2 G/DL — SIGNIFICANT CHANGE UP (ref 6–8.3)
RBC # BLD: 4.31 M/UL — SIGNIFICANT CHANGE UP (ref 3.8–5.2)
RBC # FLD: 13.1 % — SIGNIFICANT CHANGE UP (ref 10.3–16.9)
SODIUM SERPL-SCNC: 139 MMOL/L — SIGNIFICANT CHANGE UP (ref 135–145)
WBC # BLD: 10.4 K/UL — SIGNIFICANT CHANGE UP (ref 3.8–10.5)
WBC # FLD AUTO: 10.4 K/UL — SIGNIFICANT CHANGE UP (ref 3.8–10.5)

## 2018-02-28 PROCEDURE — 99285 EMERGENCY DEPT VISIT HI MDM: CPT | Mod: 25

## 2018-02-28 PROCEDURE — 85027 COMPLETE CBC AUTOMATED: CPT

## 2018-02-28 PROCEDURE — 81001 URINALYSIS AUTO W/SCOPE: CPT

## 2018-02-28 PROCEDURE — 84100 ASSAY OF PHOSPHORUS: CPT

## 2018-02-28 PROCEDURE — 96374 THER/PROPH/DIAG INJ IV PUSH: CPT | Mod: XU

## 2018-02-28 PROCEDURE — 80053 COMPREHEN METABOLIC PANEL: CPT

## 2018-02-28 PROCEDURE — 88304 TISSUE EXAM BY PATHOLOGIST: CPT

## 2018-02-28 PROCEDURE — 74177 CT ABD & PELVIS W/CONTRAST: CPT

## 2018-02-28 PROCEDURE — 83690 ASSAY OF LIPASE: CPT

## 2018-02-28 PROCEDURE — 81025 URINE PREGNANCY TEST: CPT

## 2018-02-28 PROCEDURE — 96375 TX/PRO/DX INJ NEW DRUG ADDON: CPT

## 2018-02-28 PROCEDURE — 36415 COLL VENOUS BLD VENIPUNCTURE: CPT

## 2018-02-28 PROCEDURE — 76700 US EXAM ABDOM COMPLETE: CPT

## 2018-02-28 PROCEDURE — 83735 ASSAY OF MAGNESIUM: CPT

## 2018-02-28 PROCEDURE — 85025 COMPLETE CBC W/AUTO DIFF WBC: CPT

## 2018-02-28 RX ORDER — POTASSIUM CHLORIDE 20 MEQ
40 PACKET (EA) ORAL ONCE
Qty: 0 | Refills: 0 | Status: DISCONTINUED | OUTPATIENT
Start: 2018-02-28 | End: 2018-02-28

## 2018-02-28 RX ORDER — METRONIDAZOLE 500 MG
1 TABLET ORAL
Qty: 21 | Refills: 0 | OUTPATIENT
Start: 2018-02-28 | End: 2018-03-06

## 2018-02-28 RX ORDER — MOXIFLOXACIN HYDROCHLORIDE TABLETS, 400 MG 400 MG/1
1 TABLET, FILM COATED ORAL
Qty: 14 | Refills: 0 | OUTPATIENT
Start: 2018-02-28 | End: 2018-03-06

## 2018-02-28 RX ORDER — ACETAMINOPHEN 500 MG
975 TABLET ORAL EVERY 6 HOURS
Qty: 0 | Refills: 0 | Status: DISCONTINUED | OUTPATIENT
Start: 2018-02-28 | End: 2018-02-28

## 2018-02-28 RX ADMIN — Medication 500 MILLIGRAM(S): at 04:58

## 2018-02-28 RX ADMIN — Medication 15 MILLIGRAM(S): at 05:34

## 2018-02-28 RX ADMIN — ONDANSETRON 4 MILLIGRAM(S): 8 TABLET, FILM COATED ORAL at 06:14

## 2018-02-28 RX ADMIN — HEPARIN SODIUM 5000 UNIT(S): 5000 INJECTION INTRAVENOUS; SUBCUTANEOUS at 04:58

## 2018-02-28 RX ADMIN — Medication 15 MILLIGRAM(S): at 04:58

## 2018-02-28 NOTE — DISCHARGE NOTE ADULT - CARE PLAN
Principal Discharge DX:	Acute cholecystitis  Goal:	tolerate diet  Assessment and plan of treatment:	Follow up with Dr. Cabrales in 1-2 weeks. Call the office at the number below to schedule your appointment. You may shower; soap and water over incision sites. Do not scrub. Pat dry when done. No tub bathing or swimming until cleared. Keep incision sites out of the sun as scars will darken. Ambulate as tolerated, but no heavy lifting (>10lbs) or strenuous exercise. You may resume low fat diet. You should be urinating at least 3-4x per day. Call the office if you experience increasing abdominal pain, nausea, vomiting, or temperature >101 F.

## 2018-02-28 NOTE — DISCHARGE NOTE ADULT - PLAN OF CARE
Follow up with Dr. Cabrales in 1-2 weeks. Call the office at the number below to schedule your appointment. You may shower; soap and water over incision sites. Do not scrub. Pat dry when done. No tub bathing or swimming until cleared. Keep incision sites out of the sun as scars will darken. Ambulate as tolerated, but no heavy lifting (>10lbs) or strenuous exercise. You may resume low fat diet. You should be urinating at least 3-4x per day. Call the office if you experience increasing abdominal pain, nausea, vomiting, or temperature >101 F. tolerate diet

## 2018-02-28 NOTE — PROGRESS NOTE ADULT - ASSESSMENT
43 F with acute cholecystitis now s/p laparoscopic cholecystectomy    -Pain/ Nausea control prn  -IVF  -FLD  -Ceftriaxone/ Flagyl  -SCD, SQH, OOB  -GIOVANNI

## 2018-02-28 NOTE — PROGRESS NOTE ADULT - SUBJECTIVE AND OBJECTIVE BOX
ON: passing flatus. No BM.  2/27: adv to FLD and phylicia, passing F, no BM. GIOVANNI-SS, passed TOV          43 F with acute cholecystitis now s/p laparoscopic cholecystectomy    -Pain/ Nausea control prn  -IVF  -FLD  -Ceftriaxone/ Flagyl  -SCD, SQH, OOB  -GIOVANNI ON: passing flatus. No BM.  2/27: adv to FLD and phylicia, passing F, no BM. GIOVANNI-SS, passed TOV    S/P lap nani POD # 2    SUBJECTIVE: Denies any complaints, passings flatus no BM. Denies N/V/CP/SOB      MEDICATIONS  (STANDING):  cefTRIAXone   IVPB 1 Gram(s) IV Intermittent every 12 hours  heparin  Injectable 5000 Unit(s) SubCutaneous every 8 hours  ketorolac   Injectable 15 milliGRAM(s) IV Push every 6 hours  lactated ringers. 1000 milliLiter(s) (80 mL/Hr) IV Continuous <Continuous>  melatonin 5 milliGRAM(s) Oral at bedtime  metroNIDAZOLE    Tablet 500 milliGRAM(s) Oral every 8 hours  pantoprazole  Injectable 40 milliGRAM(s) IV Push daily    MEDICATIONS  (PRN):  acetaminophen   Tablet. 975 milliGRAM(s) Oral every 6 hours PRN Severe Pain (7 - 10)  ondansetron Injectable 4 milliGRAM(s) IV Push every 4 hours PRN Nausea and/or Vomiting      Vital Signs Last 24 Hrs  T(C): 36.2 (28 Feb 2018 05:14), Max: 37 (27 Feb 2018 08:48)  T(F): 97.2 (28 Feb 2018 05:14), Max: 98.6 (27 Feb 2018 08:48)  HR: 84 (28 Feb 2018 05:14) (68 - 87)  BP: 131/76 (28 Feb 2018 05:14) (117/56 - 131/76)  BP(mean): --  RR: 17 (28 Feb 2018 05:14) (17 - 19)  SpO2: 94% (28 Feb 2018 05:14) (94% - 98%)    PHYSICAL EXAM:      Constitutional: A&Ox3      Respiratory: non labored breathing, no respiratory distress    Cardiovascular: NSR, RRR    Gastrointestinal: Soft ND,NT                 Incision: cdi , GIOVANNI intact SSx1    Genitourinary: voiding     Extremities: (-) edema                  I&O's Detail    27 Feb 2018 07:01  -  28 Feb 2018 07:00  --------------------------------------------------------  IN:    lactated ringers.: 80 mL    Oral Fluid: 220 mL  Total IN: 300 mL    OUT:    Bulb: 55 mL    Voided: 1900 mL  Total OUT: 1955 mL    Total NET: -1655 mL          LABS:                        12.6   10.4  )-----------( 222      ( 28 Feb 2018 06:44 )             38.3     02-28    139  |  103  |  8   ----------------------------<  105<H>  3.6   |  24  |  0.70    Ca    8.7      28 Feb 2018 06:44  Phos  2.9     02-28  Mg     1.8     02-28    TPro  6.2  /  Alb  3.2<L>  /  TBili  0.4  /  DBili  x   /  AST  32  /  ALT  71<H>  /  AlkPhos  119  02-28          RADIOLOGY & ADDITIONAL STUDIES:

## 2018-02-28 NOTE — PROGRESS NOTE ADULT - SUBJECTIVE AND OBJECTIVE BOX
INTERVAL HPI/OVERNIGHT EVENTS:   SURGERY ATTENDING    STATUS POST:  Laparoscopic cholecystectomy, MONIQUE, drainage for acute cholecystitis, repair of umbilical hernia      POST OPERATIVE DAY #: 2    SUBJECTIVE:  Flatus: [x ] YES [ ] NO             Bowel Movement: [ ] YES [x ] NO  Pain (0-10):         2   Pain Control Adequate: [x ] YES [ ] NO  Nausea: [ ] YES [x ] NO            Vomiting: [ ] YES [x ] NO  Diarrhea: [ ] YES [x ] NO         Constipation: [ ] YES [x ] NO     Chest Pain: [ ] YES [x ] NO    SOB:  [ ] YES [x ] NO    MEDICATIONS  (STANDING):  cefTRIAXone   IVPB 1 Gram(s) IV Intermittent every 12 hours  heparin  Injectable 5000 Unit(s) SubCutaneous every 8 hours  ketorolac   Injectable 15 milliGRAM(s) IV Push every 6 hours  melatonin 5 milliGRAM(s) Oral at bedtime  metroNIDAZOLE    Tablet 500 milliGRAM(s) Oral every 8 hours  pantoprazole  Injectable 40 milliGRAM(s) IV Push daily  potassium chloride   Powder 40 milliEquivalent(s) Oral once    MEDICATIONS  (PRN):  acetaminophen   Tablet. 975 milliGRAM(s) Oral every 6 hours PRN Severe Pain (7 - 10)  ondansetron Injectable 4 milliGRAM(s) IV Push every 4 hours PRN Nausea and/or Vomiting      Vital Signs Last 24 Hrs  T(C): 36.2 (28 Feb 2018 05:14), Max: 37 (27 Feb 2018 08:48)  T(F): 97.2 (28 Feb 2018 05:14), Max: 98.6 (27 Feb 2018 08:48)  HR: 84 (28 Feb 2018 05:14) (68 - 87)  BP: 131/76 (28 Feb 2018 05:14) (117/56 - 131/76)  BP(mean): --  RR: 17 (28 Feb 2018 05:14) (17 - 19)  SpO2: 94% (28 Feb 2018 05:14) (94% - 98%)    PHYSICAL EXAM:      Constitutional:    Eyes:    ENMT:    Neck:    Breasts:    Back:    Respiratory:    Cardiovascular:    Gastrointestinal:    Genitourinary:    Rectal:    Extremities:    Vascular:    Neurological:    Skin:    Lymph Nodes:    Musculoskeletal:    Psychiatric:        I&O's Detail    27 Feb 2018 07:01  -  28 Feb 2018 07:00  --------------------------------------------------------  IN:    lactated ringers.: 80 mL    Oral Fluid: 220 mL  Total IN: 300 mL    OUT:    Bulb: 55 mL    Voided: 1900 mL  Total OUT: 1955 mL    Total NET: -1655 mL          LABS:                        12.6   10.4  )-----------( 222      ( 28 Feb 2018 06:44 )             38.3     02-28    139  |  103  |  8   ----------------------------<  105<H>  3.6   |  24  |  0.70    Ca    8.7      28 Feb 2018 06:44  Phos  2.9     02-28  Mg     1.8     02-28    TPro  6.2  /  Alb  3.2<L>  /  TBili  0.4  /  DBili  x   /  AST  32  /  ALT  71<H>  /  AlkPhos  119  02-28          RADIOLOGY & ADDITIONAL STUDIES:

## 2018-02-28 NOTE — DISCHARGE NOTE ADULT - HOSPITAL COURSE
37  with PMHx of GERD, ramirez's esophagus, gastric ulcers (H. pylori negative) presented to [facility] with epigastric pain x9 hours. Pain first began 1 week ago after eating shrimp scampi as sharp, epigastric, constricting pain which was non-radiating and 9/10 with accompanied nausea/ vomiting and lasted for 8 hours. Pain then became mild tightness to epigastrium until it returned 5 days ago after eating chicken sandwich and french fries, subsided after several hours returning a third time on 2/25 approximately 9pm not associated with food this time. For this episode she endorses sweating with pain, denies fever/chills, Nausea/ vomiting, diarrhea/ constipation, cough, sob, cp, dysuria. She has been evaluated and EGD by GI Dr Heraclio Britton, no Hx of colonoscopy.   patient underwent lap nani, procedure was uncomplicated. Post op course was uneventful, pt voided adequately, tolerated PO intake with return of bowel function. At time of discharge patient was stable.

## 2018-02-28 NOTE — PROGRESS NOTE ADULT - ATTENDING COMMENTS
Abdomen soft, BS+, Flatus+, BM-, tolerated full liquid diet, advance to soft mechanical  OOB, Spirometry, IV ABX, wounds dry, clean, intact, GIOVANNI serous, D/C GIOVANNI, D/C home on PO ABX with F/U in the office

## 2018-02-28 NOTE — DISCHARGE NOTE ADULT - PATIENT PORTAL LINK FT
You can access the [facility] Patient Portal, offered by [facility], by registering with the following website: [email or web address]

## 2018-02-28 NOTE — DISCHARGE NOTE ADULT - CARE PROVIDER_API CALL
Melissa Cabrales (MD), Surgery  [address], [place] [address]  Phone: [phone]  Fax: [phone]

## 2018-02-28 NOTE — DISCHARGE NOTE ADULT - MEDICATION SUMMARY - MEDICATIONS TO TAKE
I will START or STAY ON the medications listed below when I get home from the hospital:    metroNIDAZOLE 500 mg oral tablet  -- 1 tab(s) by mouth every 8 hours MDD:3  -- Do not drink alcoholic beverages when taking this medication.  Finish all this medication unless otherwise directed by prescriber.  May discolor urine or feces.    -- Indication: For ABx    hydrocodone-acetaminophen 5 mg-325 mg oral tablet  -- 1 tab(s) by mouth every 8 hours, As Needed -for severe pain MDD:3   -- Caution federal law prohibits the transfer of this drug to any person other  than the person for whom it was prescribed.  May cause drowsiness.  Alcohol may intensify this effect.  Use care when operating dangerous machinery.  This product contains acetaminophen.  Do not use  with any other product containing acetaminophen to prevent possible liver damage.  Using more of this medication than prescribed may cause serious breathing problems.    -- Indication: For severe pain    Tylenol Extra Strength  mg oral tablet  -- 1 tab(s) by mouth every 6 hours, As Needed  -- This product contains acetaminophen.  Do not use  with any other product containing acetaminophen to prevent possible liver damage.    -- Indication: For mild pain    traMADol 50 mg oral tablet  -- 1 tab(s) by mouth every 6 hours, As Needed -for moderate pain MDD:4tabs  -- Caution federal law prohibits the transfer of this drug to any person other  than the person for whom it was prescribed.  May cause drowsiness.  Alcohol may intensify this effect.  Use care when operating dangerous machinery.  Obtain medical advice before taking any non-prescription drugs as some may affect the action of this medication.    -- Indication: For moderate pain    Cipro 500 mg oral tablet  -- 1 tab(s) by mouth every 12 hours MDD:2  -- Avoid prolonged or excessive exposure to direct and/or artificial sunlight while taking this medication.  Check with your doctor before becoming pregnant.  Do not take dairy products, antacids, or iron preparations within one hour of this medication.  Finish all this medication unless otherwise directed by prescriber.  Medication should be taken with plenty of water.    -- Indication: For ABx    Candi 3 mg-0.02 mg oral tablet  -- 1 tab(s) by mouth once a day  -- Indication: For home med

## 2018-03-01 ENCOUNTER — EMERGENCY (EMERGENCY)
Facility: HOSPITAL | Age: 38
LOS: 1 days | Discharge: HOME | End: 2018-03-01
Admitting: EMERGENCY MEDICINE
Payer: COMMERCIAL

## 2018-03-01 VITALS
HEART RATE: 88 BPM | OXYGEN SATURATION: 97 % | DIASTOLIC BLOOD PRESSURE: 79 MMHG | WEIGHT: 179.9 LBS | HEIGHT: 64 IN | SYSTOLIC BLOOD PRESSURE: 119 MMHG | RESPIRATION RATE: 20 BRPM | TEMPERATURE: 98 F

## 2018-03-01 DIAGNOSIS — R25.2 CRAMP AND SPASM: ICD-10-CM

## 2018-03-01 DIAGNOSIS — M79.661 PAIN IN RIGHT LOWER LEG: ICD-10-CM

## 2018-03-01 DIAGNOSIS — Z79.899 OTHER LONG TERM (CURRENT) DRUG THERAPY: ICD-10-CM

## 2018-03-01 DIAGNOSIS — Z79.2 LONG TERM (CURRENT) USE OF ANTIBIOTICS: ICD-10-CM

## 2018-03-01 DIAGNOSIS — Z90.49 ACQUIRED ABSENCE OF OTHER SPECIFIED PARTS OF DIGESTIVE TRACT: ICD-10-CM

## 2018-03-01 DIAGNOSIS — Z90.49 ACQUIRED ABSENCE OF OTHER SPECIFIED PARTS OF DIGESTIVE TRACT: Chronic | ICD-10-CM

## 2018-03-01 DIAGNOSIS — Z80.0 FAMILY HISTORY OF MALIGNANT NEOPLASM OF DIGESTIVE ORGANS: ICD-10-CM

## 2018-03-01 PROBLEM — K21.9 GASTRO-ESOPHAGEAL REFLUX DISEASE WITHOUT ESOPHAGITIS: Chronic | Status: ACTIVE | Noted: 2018-02-25

## 2018-03-01 PROCEDURE — 93971 EXTREMITY STUDY: CPT | Mod: 26,RT

## 2018-03-01 PROCEDURE — 93971 EXTREMITY STUDY: CPT

## 2018-03-01 PROCEDURE — 99284 EMERGENCY DEPT VISIT MOD MDM: CPT

## 2018-03-01 PROCEDURE — 99284 EMERGENCY DEPT VISIT MOD MDM: CPT | Mod: 25

## 2018-03-01 RX ORDER — DROSPIRENONE AND ETHINYL ESTRADIOL 0.03MG-3MG
1 KIT ORAL
Qty: 0 | Refills: 0 | COMMUNITY

## 2018-03-01 NOTE — ED PROVIDER NOTE - OBJECTIVE STATEMENT
The pt is a 36 y/o F, who presents to ED c/o R calf soreness x 1 d. Pt is 2 d post cholecystectomy, + OCPs. Pain is "a cramp", 2/10, has not taken any meds for symptoms, declines pain meds. Denies injury, numbness or tingling to toes, swelling, redness, decreased ROM, cp, sob

## 2018-03-01 NOTE — ED ADULT NURSE NOTE - OBJECTIVE STATEMENT
37 year old female c/o sharp right calf pain that began this morning. Pt had cholecystectomy 2 days ago. Pt taking oral contraceptive. denies fever, chills, chest pain, SOB, redness, swelling, and any other associated symptoms.

## 2018-03-01 NOTE — ED PROVIDER NOTE - MEDICAL DECISION MAKING DETAILS
pt presents c/o R calf cramping since yest, very concerned about dvt due to cholecystectomy 2 d ago, very low suspicion for dvt - doppler neg, pt reassured, spoke to pt's surg and plan is dc home pt presents c/o R calf cramping since yest, very concerned about dvt due to cholecystectomy 2 d ago, very low suspicion for dvt - doppler neg, no suspicion for electrolyte imbalance given unilateral symptoms and pt is eating/ drinking, pt reassured, spoke to pt's surg and plan is dc home

## 2018-03-01 NOTE — ED PROVIDER NOTE - MUSCULOSKELETAL, MLM
R LE: no swelling compared to L, no erythema, no palpable cords, + tend over posterior calf, neg dariana's sign, + light touch, FROM, soft compartments

## 2018-03-05 LAB — SURGICAL PATHOLOGY STUDY: SIGNIFICANT CHANGE UP

## 2018-03-06 DIAGNOSIS — K25.9 GASTRIC ULCER, UNSPECIFIED AS ACUTE OR CHRONIC, WITHOUT HEMORRHAGE OR PERFORATION: ICD-10-CM

## 2018-03-06 DIAGNOSIS — K81.0 ACUTE CHOLECYSTITIS: ICD-10-CM

## 2018-03-06 DIAGNOSIS — Z88.0 ALLERGY STATUS TO PENICILLIN: ICD-10-CM

## 2018-03-06 DIAGNOSIS — K22.70 BARRETT'S ESOPHAGUS WITHOUT DYSPLASIA: ICD-10-CM

## 2018-03-06 DIAGNOSIS — E66.9 OBESITY, UNSPECIFIED: ICD-10-CM

## 2018-03-06 DIAGNOSIS — K42.9 UMBILICAL HERNIA WITHOUT OBSTRUCTION OR GANGRENE: ICD-10-CM

## 2018-03-06 DIAGNOSIS — K21.9 GASTRO-ESOPHAGEAL REFLUX DISEASE WITHOUT ESOPHAGITIS: ICD-10-CM

## 2018-08-12 NOTE — ED PROVIDER NOTE - CROS ED ROS STATEMENT
Nerium Biotechnology. [address] [place] [address], [place] [address]. All rights reserved. This information is not intended as a substitute for professional medical care.  Always follow your healthcare professional's instructions.         all other ROS negative except as per HPI

## 2020-08-12 NOTE — ED PROVIDER NOTE - CONSTITUTIONAL, MLM
Assumed care at [facility]. Pt a/ox4. VSS. NSR per tele. O2 at 3l/nc in use, cpap at night. Morphine pca and tylenol scheduled for pain management. Morphine iv prn given x1 w/ relief. Abd soft. Hypoactive BS. Denies flatus, +belching.   Abd drsg dry and [place] normal... Well appearing, well nourished, awake, alert, oriented to person, place, time/situation and in no apparent distress.

## 2022-10-16 NOTE — ED PROVIDER NOTE - CROS ED CONS ALL NEG
Constitutional:  no fever, no chills  Eyes:  no discharge, no irritations, no pain, no redness, visual changes  Ears:  no ear pain, no ear drainage,  no hearing problems  Nose:  no nasal congestion, no nasal drainage  Mouth/Throat:  no hoarseness and no throat pain  Neck:  no stiffness, no pain, no lumps, no swollen glands  Cardiac:  no chest pain, no edema  Respiratory:  no cough, no shortness of breath  GI: + abdominal pain, no bloating, no constipation, +n/v/d  :  no dysuria, no urinary frequency, no hematuria, no discharge  MSK:  no back pain, no msk pain, no weakness  NEURO:  no headache, no weakness, no numbness  Skin:  no lesions, no pruritis, no jaundice, no bruising, no rash  Psych:  no known mental health issues  Endocrine:  no diabetes, no thyroid issues
negative...

## 2024-03-13 NOTE — ED ADULT NURSE NOTE - NS PRO PASSIVE SMOKE EXP
Leticia Pedroza was seen and treated in our emergency department on 3/13/2024.                Diagnosis:     Leticia  .    She may return on this date:          If you have any questions or concerns, please don't hesitate to call.      Darian Mortensen MD    ______________________________           _______________          _______________  Hospital Representative                              Date                                Time No

## 2024-10-22 NOTE — ED PROVIDER NOTE - ABDOMINAL EXAM
- s/p I&D in ED  - amoxicillin 500 TID x10 days  - pt to follow up in her OB clinic for re-evaluation in 3 days  - rto to ED if symptoms worsen or new symptoms arise  - d/w dr. baron and ER attending
soft/tender.../diffuse tender, no guarding or rebound, no CVAT